# Patient Record
Sex: FEMALE | Race: WHITE | NOT HISPANIC OR LATINO | URBAN - METROPOLITAN AREA
[De-identification: names, ages, dates, MRNs, and addresses within clinical notes are randomized per-mention and may not be internally consistent; named-entity substitution may affect disease eponyms.]

---
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
## 2020-12-29 ENCOUNTER — RECORDS - HEALTHEAST (OUTPATIENT)
Dept: LAB | Facility: CLINIC | Age: 20
End: 2020-12-29

## 2020-12-29 LAB
SARS-COV-2 PCR COMMENT: NORMAL
SARS-COV-2 RNA SPEC QL NAA+PROBE: NEGATIVE
SARS-COV-2 VIRUS SPECIMEN SOURCE: NORMAL

## 2021-05-30 ENCOUNTER — RECORDS - HEALTHEAST (OUTPATIENT)
Dept: ADMINISTRATIVE | Facility: CLINIC | Age: 21
End: 2021-05-30

## 2021-05-31 ENCOUNTER — RECORDS - HEALTHEAST (OUTPATIENT)
Dept: ADMINISTRATIVE | Facility: CLINIC | Age: 21
End: 2021-05-31

## 2021-05-31 VITALS — BODY MASS INDEX: 23.57 KG/M2 | WEIGHT: 143.8 LBS

## 2021-06-11 NOTE — PROGRESS NOTES
Subjective:      Paula Jackson is a 16 y.o. female comes in for evaluation of white spots on her tonsils for about the last week.  Symptoms seem to be getting a little bit better with mouthwash gargles and some expression with a cotton tip swab of the white spots.  Her sister has a history of tonsillar stones and had her tonsils removed and the patient is a little concerned about this.  She does not have a sore throat.  No URI symptoms.  No fevers or chills.    Objective:     /60  Pulse 71  Temp 98.9  F (37.2  C) (Oral)   Wt 143 lb 12.8 oz (65.2 kg)  LMP 06/28/2017  SpO2 100%        General: She is well-appearing    Mouth: Tonsils appear to be normal size.  A couple tiny white spots on the left tonsil consistent with tonsillar stones.  No exudate noted.    Neck: No lymphadenopathy.      Assessment/Plan:       Tiny tonsillar stones on the left side.    I suggested continuing with the mouthwash gargle and gentle expression as this seemed to have helped.    Follow-up if she develops significant swelling of the tonsils, sore throat or fever.